# Patient Record
Sex: MALE | Race: WHITE | NOT HISPANIC OR LATINO | URBAN - METROPOLITAN AREA
[De-identification: names, ages, dates, MRNs, and addresses within clinical notes are randomized per-mention and may not be internally consistent; named-entity substitution may affect disease eponyms.]

---

## 2019-02-14 ENCOUNTER — EMERGENCY (EMERGENCY)
Facility: HOSPITAL | Age: 29
LOS: 1 days | Discharge: HOME | End: 2019-02-14
Attending: EMERGENCY MEDICINE
Payer: COMMERCIAL

## 2019-02-14 VITALS
HEART RATE: 106 BPM | HEIGHT: 71 IN | SYSTOLIC BLOOD PRESSURE: 121 MMHG | DIASTOLIC BLOOD PRESSURE: 72 MMHG | RESPIRATION RATE: 18 BRPM | WEIGHT: 184.97 LBS | OXYGEN SATURATION: 100 %

## 2019-02-14 PROCEDURE — 99284 EMERGENCY DEPT VISIT MOD MDM: CPT

## 2019-02-14 PROCEDURE — 72131 CT LUMBAR SPINE W/O DYE: CPT

## 2019-02-14 PROCEDURE — 73140 X-RAY EXAM OF FINGER(S): CPT

## 2019-02-14 PROCEDURE — 73140 X-RAY EXAM OF FINGER(S): CPT | Mod: 26,RT

## 2019-02-14 PROCEDURE — 72131 CT LUMBAR SPINE W/O DYE: CPT | Mod: 26

## 2019-02-14 RX ORDER — ACETAMINOPHEN 500 MG
975 TABLET ORAL ONCE
Qty: 0 | Refills: 0 | Status: COMPLETED | OUTPATIENT
Start: 2019-02-14 | End: 2019-02-14

## 2019-02-14 RX ORDER — IBUPROFEN 200 MG
600 TABLET ORAL ONCE
Qty: 0 | Refills: 0 | Status: COMPLETED | OUTPATIENT
Start: 2019-02-14 | End: 2019-02-14

## 2019-02-14 RX ORDER — LIDOCAINE 4 G/100G
1 CREAM TOPICAL ONCE
Qty: 0 | Refills: 0 | Status: COMPLETED | OUTPATIENT
Start: 2019-02-14 | End: 2019-02-14

## 2019-02-14 RX ADMIN — Medication 600 MILLIGRAM(S): at 21:00

## 2019-02-14 RX ADMIN — Medication 975 MILLIGRAM(S): at 20:07

## 2019-02-14 RX ADMIN — LIDOCAINE 1 PATCH: 4 CREAM TOPICAL at 20:07

## 2019-02-14 RX ADMIN — Medication 600 MILLIGRAM(S): at 20:07

## 2019-02-14 RX ADMIN — Medication 975 MILLIGRAM(S): at 21:00

## 2019-02-14 NOTE — ED PROVIDER NOTE - NS CPE EDP MUSC LUMBAR LOC
No obvious deformity/bruising. +ttp midline L3-4, no obvious stepoff or disc bulge. +left paraspinal lumbar ttp at L4-5 region. Limited ROM 2/2 pain./limited ROM/tenderness

## 2019-02-14 NOTE — ED PROVIDER NOTE - EXTREMITY EXAM
no deformity, pain or tenderness, no restriction of movement/Full AROM UE/LE bilaterally with 5/5 strength. +mild tenderness noted to right IP of first digit of right hand. Full AROM all digits r hand. Intrinsic mechanisms intact. abduction and opposition of thumb intact. No scaphoid tenderness. No distal radius tenderness. Full AROM at wrist without pain. Full AROM UE/LE bilaterally with 5/5 strength.
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
## 2019-02-14 NOTE — ED ADULT NURSE NOTE - NS TRANSFER PATIENT BELONGINGS
2nd attempt to contact pt. No answer. LVM to contact office.   
Attempted to call pt to get labs scheduled, left message to call clinic.  
Labs are set up.   
Medications refilled.  Labs due.  Orders in.  Please schedule.      
Clothing

## 2019-02-14 NOTE — ED PROVIDER NOTE - CARE PLAN
Principal Discharge DX:	Acute midline low back pain without sciatica  Goal:	traumatic  Secondary Diagnosis:	Contusion of left hand, initial encounter Principal Discharge DX:	Acute midline low back pain without sciatica  Goal:	traumatic  Assessment and plan of treatment:	Please follow up with your personal medical doctor in 24-48 hours.   Bring results from today to your visit.  Make an appt with spine center within 1-2 wks. [phone] - you can also see orthopedics as needed - see attached sheet.  If your symptoms change, get worse or if you have any new symptoms, come to the ER right away.  If you have any questions, call the ER at the phone number on this page.  Secondary Diagnosis:	Contusion of left hand, initial encounter

## 2019-02-14 NOTE — ED ADULT NURSE NOTE - NSIMPLEMENTINTERV_GEN_ALL_ED
Implemented All Fall Risk Interventions:  [place] to call system. Call bell, personal items and telephone within reach. Instruct patient to call for assistance. Room bathroom lighting operational. Non-slip footwear when patient is off stretcher. Physically safe environment: no spills, clutter or unnecessary equipment. Stretcher in lowest position, wheels locked, appropriate side rails in place. Provide visual cue, wrist band, yellow gown, etc. Monitor gait and stability. Monitor for mental status changes and reorient to person, place, and time. Review medications for side effects contributing to fall risk. Reinforce activity limits and safety measures with patient and family.

## 2019-02-14 NOTE — ED PROVIDER NOTE - NSFOLLOWUPINSTRUCTIONS_ED_ALL_ED_FT
Please follow up with your personal medical doctor in 24-48 hours.   Bring results from today to your visit.  Make an appt with spine center within 1-2 wks. [phone] - you can also see orthopedics as needed - see attached sheet.  If your symptoms change, get worse or if you have any new symptoms, come to the ER right away.  If you have any questions, call the ER at the phone number on this page.

## 2019-02-14 NOTE — ED PROVIDER NOTE - CHIEF COMPLAINT
The patient is a 28y Male complaining of The patient is a 28y Male complaining of lower left back pain.

## 2019-02-14 NOTE — ED PROVIDER NOTE - PLAN OF CARE
traumatic Please follow up with your personal medical doctor in 24-48 hours.   Bring results from today to your visit.  Make an appt with spine center within 1-2 wks. [phone] - you can also see orthopedics as needed - see attached sheet.  If your symptoms change, get worse or if you have any new symptoms, come to the ER right away.  If you have any questions, call the ER at the phone number on this page.
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
## 2019-02-14 NOTE — ED PROVIDER NOTE - PROGRESS NOTE DETAILS
ASA allergy noted. Pt states he takes ibuprofen without any issue, so motrin put in. Pt also offered valium but refused, does not want muscle relaxer because has never had one. Will reassess after tylenol/motrin/lidoderm patch. - Tony Huitron PA-C now ambulatory. reviewed results w pt, feeling better, advised return precautions.

## 2019-02-15 VITALS
TEMPERATURE: 98 F | SYSTOLIC BLOOD PRESSURE: 138 MMHG | OXYGEN SATURATION: 98 % | RESPIRATION RATE: 18 BRPM | DIASTOLIC BLOOD PRESSURE: 81 MMHG | HEART RATE: 74 BPM

## 2019-02-15 RX ORDER — OXYCODONE HYDROCHLORIDE 5 MG/1
1 TABLET ORAL
Qty: 8 | Refills: 0 | OUTPATIENT
Start: 2019-02-15 | End: 2019-02-16

## 2020-02-01 ENCOUNTER — EMERGENCY (EMERGENCY)
Facility: HOSPITAL | Age: 30
LOS: 1 days | Discharge: HOME | End: 2020-02-01
Attending: EMERGENCY MEDICINE
Payer: COMMERCIAL

## 2020-02-01 VITALS
SYSTOLIC BLOOD PRESSURE: 124 MMHG | WEIGHT: 190.04 LBS | TEMPERATURE: 98 F | HEART RATE: 96 BPM | HEIGHT: 66 IN | DIASTOLIC BLOOD PRESSURE: 83 MMHG | RESPIRATION RATE: 18 BRPM | OXYGEN SATURATION: 95 %

## 2020-02-01 PROCEDURE — 99283 EMERGENCY DEPT VISIT LOW MDM: CPT | Mod: 25

## 2020-02-01 PROCEDURE — 73140 X-RAY EXAM OF FINGER(S): CPT

## 2020-02-01 PROCEDURE — 29130 APPL FINGER SPLINT STATIC: CPT

## 2020-02-01 PROCEDURE — 99053 MED SERV 10PM-8AM 24 HR FAC: CPT

## 2020-02-01 RX ORDER — ACETAMINOPHEN 500 MG
650 TABLET ORAL ONCE
Refills: 0 | Status: COMPLETED | OUTPATIENT
Start: 2020-02-01 | End: 2020-02-01

## 2020-02-01 RX ORDER — IBUPROFEN 200 MG
400 TABLET ORAL ONCE
Refills: 0 | Status: COMPLETED | OUTPATIENT
Start: 2020-02-01 | End: 2020-02-01

## 2020-02-01 RX ADMIN — Medication 400 MILLIGRAM(S): at 23:51

## 2020-02-01 RX ADMIN — Medication 650 MILLIGRAM(S): at 23:51
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
## 2020-02-01 NOTE — ED PROVIDER NOTE - NSFOLLOWUPINSTRUCTIONS_ED_ALL_ED_FT
Elevate the affected area.  Ice for 2days.  Take Ibuprofen 600mg every 6hours for pain with food.    Follow up with your  tomorrow for reevaluation.  Return for any concerns or worsening symptoms. Elevate the affected area.  Ice for 2days.  Take Ibuprofen 600mg every 6hours for pain with food.  Keep the finger splint for comfort.  Follow up with your  tomorrow for reevaluation.  Return for any concerns or worsening symptoms.

## 2020-02-01 NOTE — ED PROVIDER NOTE - MUSCULOSKELETAL MINIMAL EXAM
rt hand radius/ulnar/median nerves normal. All tendons normal, Strong pulse/good capp refill. Mild ttp over 1st mcp joint without obvious deformity, no navicular ttp.

## 2020-02-01 NOTE — ED PROVIDER NOTE - PATIENT PORTAL LINK FT
You can access the FollowMyHealth Patient Portal offered by [facility] by registering at the following website: [email or web address]. By joining [employer]’s FollowMyHealth portal, you will also be able to view your health information using other applications (apps) compatible with our system.

## 2020-02-02 PROCEDURE — 73140 X-RAY EXAM OF FINGER(S): CPT | Mod: 26,RT
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
## 2023-07-16 ENCOUNTER — EMERGENCY (EMERGENCY)
Facility: HOSPITAL | Age: 33
LOS: 1 days | Discharge: HOME | End: 2023-07-16
Attending: EMERGENCY MEDICINE | Admitting: EMERGENCY MEDICINE
Payer: COMMERCIAL

## 2023-07-16 VITALS
WEIGHT: 190.04 LBS | SYSTOLIC BLOOD PRESSURE: 168 MMHG | HEART RATE: 79 BPM | TEMPERATURE: 98 F | OXYGEN SATURATION: 97 % | DIASTOLIC BLOOD PRESSURE: 98 MMHG | RESPIRATION RATE: 20 BRPM | HEIGHT: 71 IN

## 2023-07-16 VITALS
OXYGEN SATURATION: 98 % | RESPIRATION RATE: 18 BRPM | DIASTOLIC BLOOD PRESSURE: 80 MMHG | TEMPERATURE: 98 F | SYSTOLIC BLOOD PRESSURE: 140 MMHG | HEART RATE: 80 BPM

## 2023-07-16 PROCEDURE — 99282 EMERGENCY DEPT VISIT SF MDM: CPT

## 2023-07-16 PROCEDURE — 99283 EMERGENCY DEPT VISIT LOW MDM: CPT

## 2023-07-16 RX ORDER — BENZOCAINE 10 %
1 GEL (GRAM) MUCOUS MEMBRANE ONCE
Refills: 0 | Status: COMPLETED | OUTPATIENT
Start: 2023-07-16 | End: 2023-07-16

## 2023-07-16 RX ORDER — LIDOCAINE 4 G/100G
10 CREAM TOPICAL ONCE
Refills: 0 | Status: COMPLETED | OUTPATIENT
Start: 2023-07-16 | End: 2023-07-16

## 2023-07-16 NOTE — ED ADULT NURSE NOTE - NSFALLUNIVINTERV_ED_ALL_ED
Bed/Stretcher in lowest position, wheels locked, appropriate side rails in place/Call bell, personal items and telephone in reach/Instruct patient to call for assistance before getting out of bed/chair/stretcher/Non-slip footwear applied when patient is off stretcher/[place] to call system/Physically safe environment - no spills, clutter or unnecessary equipment/Purposeful proactive rounding/Room/bathroom lighting operational, light cord in reach Bed/Stretcher in lowest position, wheels locked, appropriate side rails in place/Call bell, personal items and telephone in reach/Instruct patient to call for assistance before getting out of bed/chair/stretcher/Non-slip footwear applied when patient is off stretcher/[place] to call system/Physically safe environment - no spills, clutter or unnecessary equipment/Purposeful proactive rounding/Room/bathroom lighting operational, light cord in reach Bed/Stretcher in lowest position, wheels locked, appropriate side rails in place/Call bell, personal items and telephone in reach/Instruct patient to call for assistance before getting out of bed/chair/stretcher/Non-slip footwear applied when patient is off stretcher/[place] to call system/Physically safe environment - no spills, clutter or unnecessary equipment/Purposeful proactive rounding/Room/bathroom lighting operational, light cord in reach

## 2023-07-16 NOTE — ED ADULT TRIAGE NOTE - CHIEF COMPLAINT QUOTE
c/o choking on steak, feels like its stuck in my throat, can't swallow anything. x4 hours. airway intact/open, respirations even/labored. 97% O2 on RA. ... heimlich attempted in triage, pt spit up brown liquid, no solids.

## 2023-07-16 NOTE — ED PROVIDER NOTE - ENMT, MLM
Airway patent, Nasal mucosa clear. Mouth with normal mucosa. Throat has no vesicles, no oropharyngeal exudates and uvula is midline. no stridor

## 2023-07-16 NOTE — ED PROVIDER NOTE - PROGRESS NOTE DETAILS
pt was given po challenge of gingerale and was able to tolerate. will monitor for complaints issues. doing well will dc

## 2023-07-16 NOTE — ED PROVIDER NOTE - CARE PLAN
1 Principal Discharge DX:	Esophageal obstruction due to food impaction  Assessment and plan of treatment:	resolved

## 2023-07-16 NOTE — ED PROVIDER NOTE - OBJECTIVE STATEMENT
Patient is a 32-year-old male who has no significant medical surgical history other than a prior esophageal food impaction.  This resolved spontaneously without intervention.  And presents today for the same complaint.  He was eating a steak meal, felt foreign body sensation and began spitting up after trying to flush it down with copious amounts of water.  He panicked and came to the emergency room where a triage nurse tried to help him with the Heimlich maneuver.  This did not help, the patient tried to make himself throw up and was able to get some success.  With persistent sore throat pain he remained in the emergency room for evaluation.  He denies any chest pain or shortness of breath.  He denies any fevers or chills.  And for the duration of his weight in the emergency room he has had no longer any vomiting.

## 2023-07-16 NOTE — ED PROVIDER NOTE - PATIENT PORTAL LINK FT
You can access the FollowMyHealth Patient Portal offered by [facility] by registering at the following website: [email or web address]. By joining [employer]’s FollowMyHealth portal, you will also be able to view your health information using other applications (apps) compatible with our system. You can access the FollowMyHealth Patient Portal offered by [facility] by registering at the following website: [email or web address]. By joining [employer]’s FollowMyHealth portal, you will also be able to view your health information using other applications (apps) compatible with our system. You can access the FollowMyHealth Patient Portal offered by [facility] by registering at the following website: [email or web address]. By joining [employer]’s FollowMyHealth portal, you will also be able to view your health information using other applications (apps) compatible with our system.

## 2023-07-16 NOTE — ED PROVIDER NOTE - NS_EDPROVIDERDISPOUSERTYPE_ED_A_ED
Attending Attestation (For Attendings USE Only)... Cimzia Counseling:  I discussed with the patient the risks of Cimzia including but not limited to immunosuppression, allergic reactions and infections.  The patient understands that monitoring is required including a PPD at baseline and must alert us or the primary physician if symptoms of infection or other concerning signs are noted.

## 2023-07-16 NOTE — ED ADULT NURSE NOTE - OBJECTIVE STATEMENT
patient comes in with complaints of choking on steak, feels like its stuck in my throat, can't swallow anything. x4 hours. airway intact/open, respirations even/labored. 97% O2 on RA. Triage nurse attempted Heimlich in triage. patient spit up brown liquid, no solids as per triage RN.

## 2023-07-16 NOTE — ED PROVIDER NOTE - CLINICAL SUMMARY MEDICAL DECISION MAKING FREE TEXT BOX
Patient is an adult male who has a history of a previous esophageal food impaction presents to the emergency room today with esophageal food impaction.  He denies any fever or chills.  No shortness of breath.  States at the time of my evaluation he is able to keep some liquids down.    Plan of action includes trial of p.o. clears, which she was able to tolerate without need for vomiting.  Referral to GI, and counseling on clear liquid diet until GI evaluation.  Dragon end of dictation

## 2023-07-16 NOTE — ED PROVIDER NOTE - NSDCPRINTRESULTS_ED_ALL_ED
- from dystonia, intermittent.   - responding to valium & baclofen  - Improving with addition of IVIG  - continue same treatment.    Patient requests all Lab, Cardiology, and Radiology Results on their Discharge Instructions

## 2023-07-16 NOTE — ED PROVIDER NOTE - NSFOLLOWUPINSTRUCTIONS_ED_ALL_ED_FT
Swallowed Foreign Body, Adult  Body outline showing the digestive tract, including the stomach and esophagus.  A swallowed foreign body means that you swallowed something and it got stuck. It might be food or something else. The object may get stuck in the part of your body that moves food from your mouth to your stomach (esophagus), or it may get stuck in another part of your belly (digestive tract). Often, the object will pass through your body on its own. In some cases, the object may need to be taken out by a doctor.    It is very important to tell your doctor what you swallowed. Some swallowed objects can be very dangerous. You may need emergency treatment if:  An object gets stuck in your throat.  You cannot swallow.  You cannot breathe well.  The object is sharp.  The object is harmful or poisonous (toxic).  What are the causes?  The most common cause is food that will not pass through the part of your body that moves food from your mouth to your stomach. Foods that this may happen with include:  Meat.  Hard vegetables, such as carrots and radishes.  Other common swallowed objects include:  Pieces of bone from meat or fish.  Toothpicks.  Dentures.  What increases the risk?  Wearing dentures.  Drinking alcohol or taking drugs.  Having a mental health condition.  Having trouble with thinking and learning (cognitive impairment).  Having a narrowed or scarred area in your belly.  What are the signs or symptoms?  Pain or pressure in your throat or chest.  Not being able to swallow food, liquid, or your saliva.  Drooling.  Choking.  A hoarse voice.  Trouble breathing or noisy breathing.  Vomit that has blood in it.  How is this treated?  No treatment is needed if the object is not dangerous and will come out (pass) in your poop (stool).    If the swallowed object is not dangerous, but it is stuck in the part of your body that moves food from your mouth to your stomach:  Your doctor may gently suction out the object through your mouth.  You may be given a medicine to relax the muscles and allow the object to pass through to the stomach.  A procedure called endoscopy may be done to find and take out the object if it does not come out with medicine. Your doctor will put medical tools through a tube (endoscope) to take out the object.  You may need emergency treatment if:  The object is causing you to breathe saliva into your lungs (aspirate).  The object is pressing on your airway. This makes it hard for you to breathe.  The object can harm the inside of your belly. Some objects that can cause harm include batteries, magnets, sharp objects, and drugs.  Follow these instructions at home:  Caring for yourself    If the object in your belly is expected to come out in your poop:  Eat what you normally eat if your doctor says that you can.  Keep checking your poop to see if the object has come out of your body.  If you had a procedure to take out the object, follow instructions from your doctor about caring for yourself after the procedure.  General instructions    Take over-the-counter and prescription medicines only as told by your doctor.  Keep all follow-up visits, including any for repeat tests.  How is this prevented?  Cut your food into small pieces.  Always sit upright while eating.  Remove bones from meat and fish.  Chew your food well before swallowing.  Do not talk, laugh, or walk around while eating or swallowing.  Contact a doctor if:  You still have problems after you have been treated.  The object has not come out of your body after 3 days.  Get help right away if:  You have a fever.  You have pain in your chest or your belly.  You cough up blood.  You have blood in your poop.  You have blood in your vomit after treatment.  These symptoms may be an emergency. Get help right away. Call 911.  Do not wait to see if the symptoms will go away.  Do not drive yourself to the hospital.  Summary  A swallowed foreign body means that you swallowed something and it got stuck.  The object may get stuck in the part of the body that moves food from your mouth to your stomach, or it may get stuck in another part of your belly.  Often, the object will pass through your body on its own.  An endoscopy may be done to find and take out the object if it does not pass out of your body after you take medicine.  Call your doctor if the object has not come out of your body after 3 days, you have blood in your poop, or you have blood when you cough or vomit.  This information is not intended to replace advice given to you by your health care provider. Make sure you discuss any questions you have with your health care provider.

## 2023-07-16 NOTE — ED PROVIDER NOTE - CARE PROVIDER_API CALL
Brandon Gonzalez  Gastroenterology  [address] [place] [address]  Phone: [phone]  Fax: [phone]  Follow Up Time:    Brandon Gonzalez  Gastroenterology  [address] [place] [address]  Phone: [phone]  Fax: [phone]  Follow Up Time:    Brandon Gonzalez  Gastroenterology  [address] [place] [address]  Phone: [phone]  Fax: [phone]  Follow Up Time:

## 2023-10-30 NOTE — ED PROVIDER NOTE - PRINCIPAL DIAGNOSIS
CALLED TO GIVE REPORT AT 1940, ON HOLD FOR 9 MINUTES. WILL CALL BACK  
Esophageal obstruction due to food impaction